# Patient Record
Sex: MALE | Race: WHITE
[De-identification: names, ages, dates, MRNs, and addresses within clinical notes are randomized per-mention and may not be internally consistent; named-entity substitution may affect disease eponyms.]

---

## 2018-05-10 ENCOUNTER — HOSPITAL ENCOUNTER (INPATIENT)
Dept: HOSPITAL 88 - FSED | Age: 58
LOS: 4 days | Discharge: HOME | DRG: 175 | End: 2018-05-14
Attending: INTERNAL MEDICINE | Admitting: INTERNAL MEDICINE
Payer: COMMERCIAL

## 2018-05-10 VITALS — DIASTOLIC BLOOD PRESSURE: 71 MMHG | SYSTOLIC BLOOD PRESSURE: 97 MMHG

## 2018-05-10 VITALS — SYSTOLIC BLOOD PRESSURE: 125 MMHG | DIASTOLIC BLOOD PRESSURE: 80 MMHG

## 2018-05-10 VITALS — DIASTOLIC BLOOD PRESSURE: 79 MMHG | SYSTOLIC BLOOD PRESSURE: 125 MMHG

## 2018-05-10 VITALS — DIASTOLIC BLOOD PRESSURE: 54 MMHG | SYSTOLIC BLOOD PRESSURE: 106 MMHG

## 2018-05-10 VITALS — DIASTOLIC BLOOD PRESSURE: 82 MMHG | SYSTOLIC BLOOD PRESSURE: 114 MMHG

## 2018-05-10 VITALS — SYSTOLIC BLOOD PRESSURE: 100 MMHG | DIASTOLIC BLOOD PRESSURE: 81 MMHG

## 2018-05-10 VITALS — WEIGHT: 288.56 LBS | HEIGHT: 69 IN | BODY MASS INDEX: 42.74 KG/M2

## 2018-05-10 VITALS — SYSTOLIC BLOOD PRESSURE: 88 MMHG | DIASTOLIC BLOOD PRESSURE: 60 MMHG

## 2018-05-10 VITALS — SYSTOLIC BLOOD PRESSURE: 103 MMHG | DIASTOLIC BLOOD PRESSURE: 80 MMHG

## 2018-05-10 VITALS — DIASTOLIC BLOOD PRESSURE: 92 MMHG | SYSTOLIC BLOOD PRESSURE: 100 MMHG

## 2018-05-10 VITALS — SYSTOLIC BLOOD PRESSURE: 95 MMHG | DIASTOLIC BLOOD PRESSURE: 75 MMHG

## 2018-05-10 VITALS — DIASTOLIC BLOOD PRESSURE: 71 MMHG | SYSTOLIC BLOOD PRESSURE: 96 MMHG

## 2018-05-10 VITALS — DIASTOLIC BLOOD PRESSURE: 59 MMHG | SYSTOLIC BLOOD PRESSURE: 110 MMHG

## 2018-05-10 VITALS — SYSTOLIC BLOOD PRESSURE: 159 MMHG | DIASTOLIC BLOOD PRESSURE: 122 MMHG

## 2018-05-10 VITALS — SYSTOLIC BLOOD PRESSURE: 102 MMHG | DIASTOLIC BLOOD PRESSURE: 70 MMHG

## 2018-05-10 VITALS — SYSTOLIC BLOOD PRESSURE: 98 MMHG | DIASTOLIC BLOOD PRESSURE: 66 MMHG

## 2018-05-10 VITALS — SYSTOLIC BLOOD PRESSURE: 116 MMHG | DIASTOLIC BLOOD PRESSURE: 65 MMHG

## 2018-05-10 VITALS — DIASTOLIC BLOOD PRESSURE: 69 MMHG | SYSTOLIC BLOOD PRESSURE: 119 MMHG

## 2018-05-10 VITALS — DIASTOLIC BLOOD PRESSURE: 98 MMHG | SYSTOLIC BLOOD PRESSURE: 122 MMHG

## 2018-05-10 VITALS — SYSTOLIC BLOOD PRESSURE: 111 MMHG | DIASTOLIC BLOOD PRESSURE: 73 MMHG

## 2018-05-10 VITALS — DIASTOLIC BLOOD PRESSURE: 79 MMHG | SYSTOLIC BLOOD PRESSURE: 109 MMHG

## 2018-05-10 VITALS — SYSTOLIC BLOOD PRESSURE: 56 MMHG | DIASTOLIC BLOOD PRESSURE: 26 MMHG

## 2018-05-10 VITALS — SYSTOLIC BLOOD PRESSURE: 105 MMHG | DIASTOLIC BLOOD PRESSURE: 66 MMHG

## 2018-05-10 VITALS — DIASTOLIC BLOOD PRESSURE: 89 MMHG | SYSTOLIC BLOOD PRESSURE: 119 MMHG

## 2018-05-10 VITALS — SYSTOLIC BLOOD PRESSURE: 115 MMHG | DIASTOLIC BLOOD PRESSURE: 63 MMHG

## 2018-05-10 VITALS — SYSTOLIC BLOOD PRESSURE: 104 MMHG | DIASTOLIC BLOOD PRESSURE: 67 MMHG

## 2018-05-10 VITALS — DIASTOLIC BLOOD PRESSURE: 70 MMHG | SYSTOLIC BLOOD PRESSURE: 111 MMHG

## 2018-05-10 VITALS — SYSTOLIC BLOOD PRESSURE: 104 MMHG | DIASTOLIC BLOOD PRESSURE: 86 MMHG

## 2018-05-10 VITALS — SYSTOLIC BLOOD PRESSURE: 105 MMHG | DIASTOLIC BLOOD PRESSURE: 71 MMHG

## 2018-05-10 VITALS — SYSTOLIC BLOOD PRESSURE: 100 MMHG | DIASTOLIC BLOOD PRESSURE: 52 MMHG

## 2018-05-10 VITALS — DIASTOLIC BLOOD PRESSURE: 84 MMHG | SYSTOLIC BLOOD PRESSURE: 104 MMHG

## 2018-05-10 VITALS — DIASTOLIC BLOOD PRESSURE: 82 MMHG | SYSTOLIC BLOOD PRESSURE: 111 MMHG

## 2018-05-10 VITALS — SYSTOLIC BLOOD PRESSURE: 104 MMHG | DIASTOLIC BLOOD PRESSURE: 82 MMHG

## 2018-05-10 VITALS — SYSTOLIC BLOOD PRESSURE: 53 MMHG | DIASTOLIC BLOOD PRESSURE: 42 MMHG

## 2018-05-10 DIAGNOSIS — I48.0: ICD-10-CM

## 2018-05-10 DIAGNOSIS — J96.01: ICD-10-CM

## 2018-05-10 DIAGNOSIS — I27.20: ICD-10-CM

## 2018-05-10 DIAGNOSIS — E87.1: ICD-10-CM

## 2018-05-10 DIAGNOSIS — F17.210: ICD-10-CM

## 2018-05-10 DIAGNOSIS — I26.99: Primary | ICD-10-CM

## 2018-05-10 DIAGNOSIS — E66.01: ICD-10-CM

## 2018-05-10 DIAGNOSIS — R60.0: ICD-10-CM

## 2018-05-10 LAB
CHOLEST SERPL-MCNC: 122 MD/DL (ref 0–199)
CHOLEST/HDLC SERPL: 3.6 {RATIO} (ref 3.9–4.7)
CK MB SERPL-MCNC: 1 NG/ML (ref 0–5)
CK MB SERPL-MCNC: 1.1 NG/ML (ref 0–5)
CK SERPL-CCNC: 82 IU/L (ref 30–200)
CK SERPL-CCNC: 85 IU/L (ref 30–200)
DEPRECATED INR PLAS: 1.28
HCT VFR BLD AUTO: 49.7 % (ref 38.2–49.6)
HDLC SERPL-MSCNC: 34 MG/DL (ref 40–60)
HGB BLD-MCNC: 16.6 G/DL (ref 14–18)
LDLC SERPL CALC-MCNC: 75 MG/DL (ref 60–130)
PROTHROMBIN TIME: 15 SECONDS (ref 11.9–14.5)
TRIGL SERPL-MCNC: 63 MG/DL (ref 0–149)

## 2018-05-10 PROCEDURE — 71260 CT THORAX DX C+: CPT

## 2018-05-10 PROCEDURE — 84484 ASSAY OF TROPONIN QUANT: CPT

## 2018-05-10 PROCEDURE — 96372 THER/PROPH/DIAG INJ SC/IM: CPT

## 2018-05-10 PROCEDURE — 83036 HEMOGLOBIN GLYCOSYLATED A1C: CPT

## 2018-05-10 PROCEDURE — 80053 COMPREHEN METABOLIC PANEL: CPT

## 2018-05-10 PROCEDURE — 85014 HEMATOCRIT: CPT

## 2018-05-10 PROCEDURE — 36415 COLL VENOUS BLD VENIPUNCTURE: CPT

## 2018-05-10 PROCEDURE — 85025 COMPLETE CBC W/AUTO DIFF WBC: CPT

## 2018-05-10 PROCEDURE — 85379 FIBRIN DEGRADATION QUANT: CPT

## 2018-05-10 PROCEDURE — 93306 TTE W/DOPPLER COMPLETE: CPT

## 2018-05-10 PROCEDURE — 82550 ASSAY OF CK (CPK): CPT

## 2018-05-10 PROCEDURE — 71045 X-RAY EXAM CHEST 1 VIEW: CPT

## 2018-05-10 PROCEDURE — 80048 BASIC METABOLIC PNL TOTAL CA: CPT

## 2018-05-10 PROCEDURE — 93005 ELECTROCARDIOGRAM TRACING: CPT

## 2018-05-10 PROCEDURE — 96376 TX/PRO/DX INJ SAME DRUG ADON: CPT

## 2018-05-10 PROCEDURE — 99284 EMERGENCY DEPT VISIT MOD MDM: CPT

## 2018-05-10 PROCEDURE — 96374 THER/PROPH/DIAG INJ IV PUSH: CPT

## 2018-05-10 PROCEDURE — 87040 BLOOD CULTURE FOR BACTERIA: CPT

## 2018-05-10 PROCEDURE — 96375 TX/PRO/DX INJ NEW DRUG ADDON: CPT

## 2018-05-10 PROCEDURE — 85610 PROTHROMBIN TIME: CPT

## 2018-05-10 PROCEDURE — 85018 HEMOGLOBIN: CPT

## 2018-05-10 PROCEDURE — 83735 ASSAY OF MAGNESIUM: CPT

## 2018-05-10 PROCEDURE — 84443 ASSAY THYROID STIM HORMONE: CPT

## 2018-05-10 PROCEDURE — 80061 LIPID PANEL: CPT

## 2018-05-10 PROCEDURE — 82553 CREATINE MB FRACTION: CPT

## 2018-05-10 PROCEDURE — 96367 TX/PROPH/DG ADDL SEQ IV INF: CPT

## 2018-05-10 PROCEDURE — 93970 EXTREMITY STUDY: CPT

## 2018-05-10 RX ADMIN — SODIUM CHLORIDE SCH GM: 9 INJECTION, SOLUTION INTRAVENOUS at 07:19

## 2018-05-10 RX ADMIN — METOPROLOL TARTRATE PRN MG: 1 INJECTION, SOLUTION INTRAVENOUS at 11:25

## 2018-05-10 RX ADMIN — Medication PRN MG: at 20:28

## 2018-05-10 RX ADMIN — METOPROLOL TARTRATE SCH MG: 25 TABLET, FILM COATED ORAL at 17:49

## 2018-05-10 RX ADMIN — LEVOFLOXACIN SCH MLS/HR: 5 INJECTION, SOLUTION INTRAVENOUS at 11:25

## 2018-05-10 RX ADMIN — ENOXAPARIN SODIUM SCH MG: 100 INJECTION SUBCUTANEOUS at 20:28

## 2018-05-10 RX ADMIN — METOPROLOL TARTRATE PRN MG: 1 INJECTION, SOLUTION INTRAVENOUS at 19:24

## 2018-05-10 NOTE — DIAGNOSTIC IMAGING REPORT
PROCEDURE:

CT scan of the chest  WITH intravenous contrast, using pulmonary 

angiogram protocol.

 

TECHNIQUE:

The chest was scanned utilizing a multidetector helical scanner from 

the lung apex through the level of the adrenal glands after the IV 

administration of 85 cc of Isovue 370.  Coronal and sagittal 

multiplanar reformations were obtained.

 

COMPARISON: None.

 

INDICATIONS:  ATRIAL FIBRILLATION, SHORTNESS OF BREATH

    

FINDINGS:

Lines/tubes:  None.

 

Lungs and Airways: 

Good contrast bolus.

Large pulmonary embolus in the right lower lobe pulmonary artery. 

Involvement of the lateral branch of the right middle lobe pulmonary 

artery. (Series 2 image 57) Small emboli in the posterior branch of the 

left upper lobe pulmonary artery. (Series 2 image 39) Questionable 

other eccentric filling defects in the left lower lobe.

 

 

Mild paraseptal emphysematous changes with upper lobe predominance. 

Diffuse groundglass opacities involving the right lower lobe.

A few scattered areas of groundglass opacities in the right middle and 

left upper lobe.

Left lower lobe atelectasis and possible aspiration/pneumonia.

Calcified granuloma in the right middle lobe (series 3 image 64).

Calcified granuloma in the left lower lobe (series 3 image 67).

 

Pleura: Small right pleural effusion.

 

Heart and mediastinum: The thyroid gland is normal. No significant 

mediastinal, hilar or axillary lymphadenopathy is seen. 0.8 cm 

subcarinal lymph node. 0.8 cm prevascular lymph node. The heart and 

pericardium are within normal limits. Main pulmonary artery is 2.4 cm. 

Ascending aorta measures 3.7 cm. Mild atherosclerotic calcifications in 

the aorta.

 

Soft tissues: Normal.

 

Abdomen: Limited contrast-enhanced views of the upper abdomen show no 

abnormality within the visualized liver, spleen, pancreas, or kidneys. 

The adrenal glands are normal. Nonspecific perinephric fat stranding 

bilateral kidneys. 3.1 cm cystic lesion in the superior pole of the 

left kidney.

 

Bones: The visualized bony thorax is within normal limits. 

 

IMPRESSION: 

 

1. Pulmonary embolus in bilateral lungs with large burden in the right 

lower lobe artery.

2. Diffuse groundglass opacities in the right lower lobe is likely 

related to hemorrhage/pulmonary infarct from pulmonary emboli.

3. Scattered mild nonspecific groundglass opacities in bilateral lungs. 

These may be infectious versus related to smaller non-visualized 

pulmonary emboli.

 

Results were communicated with a patient's nurse and Dr. Contreras by Dr. Okeefe on 5/10/2018 at 3:37 PM.

 

 

Dictated by:  Clifford Okeefe M.D. on 5/10/2018 at 15:41     

Electronically approved by:  Clifford Okeefe M.D. on 5/10/2018 at 15:41

## 2018-05-10 NOTE — CONSULTATION
DATE OF CONSULTATION:  May 10, 2018 



CARDIOLOGY CONSULTATION



REQUESTING PHYSICIAN:  Dr. Ben Woods.



REASON FOR CONSULTATION:  Atrial fibrillation with rapid ventricular 

response.



HISTORY OF PRESENT ILLNESS:  This is a 57-year-old man without significant 

past medical history, who presented with complaints of right-sided 

abdominal pain. The patient reports he was in his usual state of health 

until a few days prior when he developed what he described as a cold. 

Yesterday afternoon he developed a right-sided abdominal pain right 

underneath his rib cage. He did not think anything of this and proceeded to 

run some errands. However, this pain worsened yesterday evening such that 

it was 9 to 10 out of 10 in severity. He noted the pain was worse with 

cough, deep inspiration and lying down. The pain was associated with 

shortness of breath but no nausea or diaphoresis. Given worsening of the 

pain, he presented to the ER for further evaluation. On arrival to the ER, 

he was noted to have atrial fibrillation with rapid ventricular response, 

and chest x-ray demonstrated a medium-sized well-defined infiltrate in the 

right lower lobe and a small patchy infiltrate in the left lower lobe 

consistent with pneumonia and a leukocytosis at 15.2. He was, therefore, 

admitted to [facility] for further care. 



REVIEW OF SYSTEMS:  Negative except as per HPI.



PAST MEDICAL HISTORY:  Obesity.



PAST SURGICAL HISTORY:  Phimosis.



ALLERGIES:  NO KNOWN DRUG ALLERGIES.



MEDICATIONS:  None.



SOCIAL HISTORY:  He smokes a pack a day since the age of 12. Occasional 

alcohol. No illicit drugs.



FAMILY HISTORY:  Pertinent for father with heart disease. 



PHYSICAL EXAMINATION

VITAL SIGNS:  Temperature 96.5 degrees, pulse 156, respiratory rate 22, 

blood pressure 105/66, oxygen saturation 97% on nasal cannula.

GENERAL:  Obese gentleman in no acute distress, well developed, well 

nourished. 

HEENT:  Normocephalic, atraumatic. Pupils equal, no scleral icterus.

NECK:  Supple. No thyromegaly or cervical lymphadenopathy, no carotid 

bruits.

LUNGS:  Decreased breath sounds, scattered wheezes, no crackles.

CARDIOVASCULAR:  Tachycardic, irregularly irregular. No murmur. Normal S1 

and S2. 

ABDOMEN:  Soft, nontender.

EXTREMITIES:  1+ pitting edema. Lymphedema is also present. 



EKG:  Atrial fibrillation with rapid ventricular response.



LABS:  Sodium 135, potassium 4.2, chloride 99, CO2 27, BUN 14, creatinine 

1. D-dimer 970. Troponin less than 0.05. WBC 15.2, hemoglobin 18.2, 

hematocrit 29.9, platelets 152. 



IMPRESSION

1. Pneumonia.

2. Atrial fibrillation with rapid ventricular response.

3. Obesity. 

4. Right-sided abdominal pain, likely pleuritic in etiology.



RECOMMENDATIONS:  Start scheduled metoprolol tartrate for rate control. 

Continue IV metoprolol as necessary. Agree with anticoagulation with 

Lovenox. Antibiotics per primary service. Obtain TSH and echocardiogram. 



Thank you for this consult. We will continue to follow.





 





DD:  05/10/2018 13:03

DT:  05/10/2018 14:08

Job#:  [id number]

## 2018-05-10 NOTE — HISTORY AND PHYSICAL
PRIMARY CARE PHYSICIAN:  _______.



CHIEF COMPLAINT:  Right upper quadrant/right lower chest discomfort and 

severe shortness of breath.



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 

went shopping with a friend. Thereafter, he had seafood with oysters and 

froglegs, had several beers, and he returned to the ship. As he entered 

into bed, he had immediately jumped out of bed because of severe right 

upper quadrant/right lower chest discomfort. He denies any leg pain. He 

also started having shortness of breath, which has worsened. Denies any 

chest pain. Denies any history of venous thromboembolism. He does smoke 1 

pack of cigarettes per day. He was taken to the urgent care facility at [facility], found to be in atrial fibrillation with rapid 

ventricular response, and was sent here to the ICU at [facility]. The 

CTA done here showed a pulmonary embolism in bilateral lungs with large 

burden in right lower lobe artery, also ground-glass opacities seen. 

Patient admitted for further evaluation and management. Currently sitting 

in chair, unable to lie in bed. He has oxygen in place.



PAST MEDICAL HISTORY:  Cigarette abuse. Chronic leg edema.



PAST SURGICAL HISTORY:  Phimosis surgery as a 7-year-old boy.



ALLERGIES:  PER THE ELECTRONIC MEDICAL RECORDS.



FAMILY HISTORY:  No history of venous thromboembolism.



SOCIAL HISTORY:  Patient works as a captain on a ship. He smokes 1 pack of 

cigarettes per day. Occasional alcohol. No illicits.



MEDICATIONS:  Per the electronic medical records. Medications reviewed.



REVIEW OF SYSTEMS:  Denies any dizziness or chest pain.



VITAL SIGNS:  Have been reviewed. 



PHYSICAL EXAMINATION

GENERAL APPEARANCE:  A tired-appearing man resting in chair with nasal 

cannula in place.

HEENT:  Anicteric. 

CARDIOVASCULAR:  Normal S1/S2. 

LUNGS:  He has reduced breath sounds, mildly coarse. He has more reduced 

breath sounds at the bases. 

ABDOMEN:  Soft, nondistended. He has tenderness in the right upper 

quadrant/right lower rib region.

EXTREMITIES:  He has 1+ leg edema bilaterally. Calf exam is deferred.

SKIN:  Dry.

PSYCHIATRIC:  Normal affect.

NEUROLOGICALLY:  Alert and oriented x3. Moving all extremities.



LABS:  Reviewed.



ASSESSMENT AND PLAN:  This is a 57-year-old man.

1. Atrial fibrillation with rapid ventricular response. Start on 

anticoagulation. Will control his rate. Obtain a 2-D echocardiogram. 

Obtain TSH. Obtain lipid panel.

2. Pulmonary embolism in the bilateral lungs, large burden in the right 

lower lobe artery. Continue anticoagulation. Pulmonary consultation has 

been placed. I have encouraged the patient to quit all cigarette use.

3. Cigarette abuse. Quit cigarettes now. Will start nicotine patch.

4. Ground-glass opacities in the right lower lobe. Could be related to 

the pulmonary emboli versus a pulmonary infarct. We will defer to 

pulmonary services.

5. Leukocytosis. Could be related to marginalization in the setting of 

acute pulmonary emboli. Will treat him empirically with antibiotics in 

the meantime for coverage.

6. Obesity. BMI is 37.5. Will obtain hemoglobin A1c and screen for 

diabetes. Obtain lipid panel.

7. Prophylaxis. Will use Pepcid b.i.d. while he is on anticoagulation.

8. Disposition. Will reduce aspirin to 81 mg daily. Cardiac enzymes are 

negative. 

9. Critical care time. More than 35 minutes.





  







DD:  05/10/2018 17:00

DT:  05/10/2018 17:20

Job#:  [id number]

## 2018-05-11 VITALS — DIASTOLIC BLOOD PRESSURE: 96 MMHG | SYSTOLIC BLOOD PRESSURE: 112 MMHG

## 2018-05-11 VITALS — SYSTOLIC BLOOD PRESSURE: 123 MMHG | DIASTOLIC BLOOD PRESSURE: 68 MMHG

## 2018-05-11 VITALS — SYSTOLIC BLOOD PRESSURE: 115 MMHG | DIASTOLIC BLOOD PRESSURE: 84 MMHG

## 2018-05-11 VITALS — SYSTOLIC BLOOD PRESSURE: 88 MMHG | DIASTOLIC BLOOD PRESSURE: 71 MMHG

## 2018-05-11 VITALS — DIASTOLIC BLOOD PRESSURE: 56 MMHG | SYSTOLIC BLOOD PRESSURE: 119 MMHG

## 2018-05-11 VITALS — SYSTOLIC BLOOD PRESSURE: 104 MMHG | DIASTOLIC BLOOD PRESSURE: 69 MMHG

## 2018-05-11 VITALS — SYSTOLIC BLOOD PRESSURE: 123 MMHG | DIASTOLIC BLOOD PRESSURE: 75 MMHG

## 2018-05-11 VITALS — SYSTOLIC BLOOD PRESSURE: 98 MMHG | DIASTOLIC BLOOD PRESSURE: 73 MMHG

## 2018-05-11 VITALS — DIASTOLIC BLOOD PRESSURE: 66 MMHG | SYSTOLIC BLOOD PRESSURE: 106 MMHG

## 2018-05-11 VITALS — DIASTOLIC BLOOD PRESSURE: 55 MMHG | SYSTOLIC BLOOD PRESSURE: 115 MMHG

## 2018-05-11 VITALS — SYSTOLIC BLOOD PRESSURE: 121 MMHG | DIASTOLIC BLOOD PRESSURE: 78 MMHG

## 2018-05-11 VITALS — SYSTOLIC BLOOD PRESSURE: 98 MMHG | DIASTOLIC BLOOD PRESSURE: 69 MMHG

## 2018-05-11 VITALS — DIASTOLIC BLOOD PRESSURE: 72 MMHG | SYSTOLIC BLOOD PRESSURE: 117 MMHG

## 2018-05-11 VITALS — SYSTOLIC BLOOD PRESSURE: 106 MMHG | DIASTOLIC BLOOD PRESSURE: 70 MMHG

## 2018-05-11 VITALS — SYSTOLIC BLOOD PRESSURE: 122 MMHG | DIASTOLIC BLOOD PRESSURE: 79 MMHG

## 2018-05-11 VITALS — DIASTOLIC BLOOD PRESSURE: 66 MMHG | SYSTOLIC BLOOD PRESSURE: 111 MMHG

## 2018-05-11 VITALS — SYSTOLIC BLOOD PRESSURE: 124 MMHG | DIASTOLIC BLOOD PRESSURE: 61 MMHG

## 2018-05-11 VITALS — SYSTOLIC BLOOD PRESSURE: 94 MMHG | DIASTOLIC BLOOD PRESSURE: 46 MMHG

## 2018-05-11 VITALS — SYSTOLIC BLOOD PRESSURE: 100 MMHG | DIASTOLIC BLOOD PRESSURE: 67 MMHG

## 2018-05-11 VITALS — DIASTOLIC BLOOD PRESSURE: 52 MMHG | SYSTOLIC BLOOD PRESSURE: 111 MMHG

## 2018-05-11 VITALS — DIASTOLIC BLOOD PRESSURE: 69 MMHG | SYSTOLIC BLOOD PRESSURE: 124 MMHG

## 2018-05-11 VITALS — DIASTOLIC BLOOD PRESSURE: 63 MMHG | SYSTOLIC BLOOD PRESSURE: 123 MMHG

## 2018-05-11 VITALS — SYSTOLIC BLOOD PRESSURE: 116 MMHG | DIASTOLIC BLOOD PRESSURE: 65 MMHG

## 2018-05-11 VITALS — SYSTOLIC BLOOD PRESSURE: 101 MMHG | DIASTOLIC BLOOD PRESSURE: 58 MMHG

## 2018-05-11 VITALS — DIASTOLIC BLOOD PRESSURE: 60 MMHG | SYSTOLIC BLOOD PRESSURE: 107 MMHG

## 2018-05-11 VITALS — DIASTOLIC BLOOD PRESSURE: 73 MMHG | SYSTOLIC BLOOD PRESSURE: 116 MMHG

## 2018-05-11 VITALS — DIASTOLIC BLOOD PRESSURE: 61 MMHG | SYSTOLIC BLOOD PRESSURE: 99 MMHG

## 2018-05-11 VITALS — SYSTOLIC BLOOD PRESSURE: 102 MMHG | DIASTOLIC BLOOD PRESSURE: 64 MMHG

## 2018-05-11 VITALS — SYSTOLIC BLOOD PRESSURE: 117 MMHG | DIASTOLIC BLOOD PRESSURE: 83 MMHG

## 2018-05-11 VITALS — DIASTOLIC BLOOD PRESSURE: 55 MMHG | SYSTOLIC BLOOD PRESSURE: 101 MMHG

## 2018-05-11 VITALS — DIASTOLIC BLOOD PRESSURE: 75 MMHG | SYSTOLIC BLOOD PRESSURE: 111 MMHG

## 2018-05-11 VITALS — SYSTOLIC BLOOD PRESSURE: 126 MMHG | DIASTOLIC BLOOD PRESSURE: 72 MMHG

## 2018-05-11 VITALS — SYSTOLIC BLOOD PRESSURE: 107 MMHG | DIASTOLIC BLOOD PRESSURE: 59 MMHG

## 2018-05-11 VITALS — DIASTOLIC BLOOD PRESSURE: 48 MMHG | SYSTOLIC BLOOD PRESSURE: 87 MMHG

## 2018-05-11 VITALS — DIASTOLIC BLOOD PRESSURE: 66 MMHG | SYSTOLIC BLOOD PRESSURE: 116 MMHG

## 2018-05-11 VITALS — SYSTOLIC BLOOD PRESSURE: 95 MMHG | DIASTOLIC BLOOD PRESSURE: 69 MMHG

## 2018-05-11 VITALS — SYSTOLIC BLOOD PRESSURE: 113 MMHG | DIASTOLIC BLOOD PRESSURE: 79 MMHG

## 2018-05-11 VITALS — DIASTOLIC BLOOD PRESSURE: 84 MMHG | SYSTOLIC BLOOD PRESSURE: 131 MMHG

## 2018-05-11 VITALS — SYSTOLIC BLOOD PRESSURE: 102 MMHG | DIASTOLIC BLOOD PRESSURE: 59 MMHG

## 2018-05-11 VITALS — DIASTOLIC BLOOD PRESSURE: 89 MMHG | SYSTOLIC BLOOD PRESSURE: 118 MMHG

## 2018-05-11 VITALS — SYSTOLIC BLOOD PRESSURE: 113 MMHG | DIASTOLIC BLOOD PRESSURE: 70 MMHG

## 2018-05-11 LAB
ANION GAP SERPL CALC-SCNC: 14.1 MMOL/L (ref 8–16)
ANISOCYTOSIS BLD QL SMEAR: SLIGHT
BASOPHILS # BLD AUTO: 0 10*3/UL (ref 0–0.1)
BASOPHILS NFR BLD AUTO: 0.2 % (ref 0–1)
BUN SERPL-MCNC: 12 MG/DL (ref 7–26)
BUN/CREAT SERPL: 16 (ref 6–25)
CALCIUM SERPL-MCNC: 9 MG/DL (ref 8.4–10.2)
CHLORIDE SERPL-SCNC: 97 MMOL/L (ref 98–107)
CHOLEST SERPL-MCNC: 105 MD/DL (ref 0–199)
CHOLEST/HDLC SERPL: 3 {RATIO} (ref 3.9–4.7)
CO2 SERPL-SCNC: 23 MMOL/L (ref 22–29)
DEPRECATED INR PLAS: 1.3
DEPRECATED NEUTROPHILS # BLD AUTO: 13.2 10*3/UL (ref 2.1–6.9)
EGFRCR SERPLBLD CKD-EPI 2021: > 60 ML/MIN (ref 60–?)
EOSINOPHIL # BLD AUTO: 0.1 10*3/UL (ref 0–0.4)
EOSINOPHIL NFR BLD AUTO: 0.6 % (ref 0–6)
ERYTHROCYTE [DISTWIDTH] IN CORD BLOOD: 14.1 % (ref 11.7–14.4)
GLUCOSE SERPLBLD-MCNC: 130 MG/DL (ref 74–118)
HCT VFR BLD AUTO: 48.4 % (ref 38.2–49.6)
HDLC SERPL-MSCNC: 35 MG/DL (ref 40–60)
HGB BLD-MCNC: 16.1 G/DL (ref 14–18)
LDLC SERPL CALC-MCNC: 60 MG/DL (ref 60–130)
LYMPHOCYTES # BLD: 1.4 10*3/UL (ref 1–3.2)
LYMPHOCYTES NFR BLD AUTO: 8.1 % (ref 18–39.1)
LYMPHOCYTES NFR BLD MANUAL: 7 % (ref 19–48)
MACROCYTES BLD QL SMEAR: SLIGHT
MCH RBC QN AUTO: 30 PG (ref 28–32)
MCHC RBC AUTO-ENTMCNC: 33.3 G/DL (ref 31–35)
MCV RBC AUTO: 90.1 FL (ref 81–99)
MONOCYTES # BLD AUTO: 1.8 10*3/UL (ref 0.2–0.8)
MONOCYTES NFR BLD AUTO: 10.7 % (ref 4.4–11.3)
MONOCYTES NFR BLD MANUAL: 13 % (ref 3.4–9)
NEUTS SEG NFR BLD AUTO: 79.7 % (ref 38.7–80)
NEUTS SEG NFR BLD MANUAL: 80 % (ref 40–74)
PLAT MORPH BLD: NORMAL
PLATELET # BLD AUTO: 178 X10E3/UL (ref 140–360)
PLATELET # BLD EST: ADEQUATE 10*3/UL
POTASSIUM SERPL-SCNC: 4.1 MMOL/L (ref 3.5–5.1)
PROTHROMBIN TIME: 15.2 SECONDS (ref 11.9–14.5)
RBC # BLD AUTO: 5.37 X10E6/UL (ref 4.3–5.7)
RBC MORPH BLD: NORMAL
SODIUM SERPL-SCNC: 130 MMOL/L (ref 136–145)
TRIGL SERPL-MCNC: 52 MG/DL (ref 0–149)

## 2018-05-11 RX ADMIN — SODIUM CHLORIDE SCH GM: 9 INJECTION, SOLUTION INTRAVENOUS at 05:26

## 2018-05-11 RX ADMIN — METOPROLOL TARTRATE SCH MG: 25 TABLET, FILM COATED ORAL at 05:26

## 2018-05-11 RX ADMIN — METOPROLOL TARTRATE SCH MG: 25 TABLET, FILM COATED ORAL at 00:26

## 2018-05-11 RX ADMIN — METOPROLOL TARTRATE SCH MG: 25 TABLET, FILM COATED ORAL at 11:56

## 2018-05-11 RX ADMIN — Medication PRN MG: at 05:27

## 2018-05-11 RX ADMIN — METOPROLOL TARTRATE SCH MG: 25 TABLET, FILM COATED ORAL at 18:07

## 2018-05-11 RX ADMIN — ASPIRIN 81 MG CHEWABLE TABLET SCH MG: 81 TABLET CHEWABLE at 09:03

## 2018-05-11 RX ADMIN — NICOTINE SCH MG: 21 PATCH, EXTENDED RELEASE TRANSDERMAL at 09:00

## 2018-05-11 RX ADMIN — LEVOFLOXACIN SCH MLS/HR: 5 INJECTION, SOLUTION INTRAVENOUS at 11:51

## 2018-05-11 RX ADMIN — ENOXAPARIN SODIUM SCH MG: 100 INJECTION SUBCUTANEOUS at 20:24

## 2018-05-11 RX ADMIN — METOPROLOL TARTRATE PRN MG: 1 INJECTION, SOLUTION INTRAVENOUS at 16:27

## 2018-05-11 RX ADMIN — ENOXAPARIN SODIUM SCH MG: 100 INJECTION SUBCUTANEOUS at 09:03

## 2018-05-11 NOTE — PROGRESS NOTE
DATE:  May 11, 2018 



CARDIOLOGY PROGRESS NOTE 



SUBJECTIVE:  Patient denies shortness of breath.  He continues to have 

right-sided chest pain.  Extensive discussion was held with the patient 

regarding anticoagulation for the patient's newly diagnosed pulmonary 

emboli.  Discussion included vitamin K antagonist such as Coumadin or 

warfarin and the need for consistent diet and frequent INR checks as well 

as novel oral anticoagulants which are not reversible.  Extensive 

discussion was held with the patient regarding vitamin K antagonist and 

novel anticoagulants.  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 

the ship, thus novel oral anticoagulants are probably the better option in 

his circumstances.  He indicates that he does not believe that he would 

have any problems obtaining the novel oral anticoagulation.  He understands 

that these do not have reversal agents readily available.



OBJECTIVE

VITALS:  Temperature 97.7 degrees, pulse 129, respiratory rate 20, blood 

pressure 106/70, oxygen saturation 96% on 3 L nasal cannula.  

GENERAL:  Obese gentleman in no acute distress, awake and alert.

LUNGS:  Decreased breath sounds at the bases.  No wheezes or crackles.

CARDIOVASCULAR:  Tachycardic, irregularly irregular.  Normal S1 and S2.  No 

murmur.

ABDOMEN:  Soft.

EXTREMITIES:  There is 1+ pitting edema.  Lymphedema is noted.



CARDIAC MEDICATIONS 

1. Metoprolol tartrate 25 mg p.o. q.6 h. 

2. Aspirin 81 mg p.o. daily.

3. Enoxaparin 100 mg p.o. subcutaneous q.24 h. 

4. Warfarin 7.5 mg p.o. daily.



LABS:  WBC 16.59, hemoglobin 16.1, hematocrit 48.4, platelets 178.  Sodium 

130, potassium 4.1, chloride 97, CO2 23, BUN 12, creatinine 0.74.  Troponin 

less than 0.001.  Cholesterol 105, LDL 60, HDL 35, triglycerides 52.  



CT of the chest:  Pulmonary embolus in bilateral lungs with large burden in 

the right lower lobe artery.  Diffuse ground-glass opacities in the right 

lower lobe are likely related to hemorrhage/pulmonary infarct from 

pulmonary emboli.  Scattered mild nonspecific ground-glass opacities in 

bilateral lungs.  These may be infectious versus related to smaller, 

nonvisualized, pulmonary emboli.  



TELEMETRY:  Atrial fibrillation with rapid ventricular response.



IMPRESSION

1. Bilateral pulmonary emboli with large burden in the right lower lobe 

artery. 

2. Atrial fibrillation with rapid ventricular response.

3. Acute hypoxic respiratory failure due to pulmonary emboli.

4. Pleuritic chest pain.

5. Obesity. 



RECOMMENDATIONS:  We will titrate up metoprolol tartrate.  Stop warfarin.  

Continue the patient on Lovenox.  He will need to be discharged on Eliquis 

10 mg p.o. twice a day for 1 week followed by 5 mg daily thereafter.  In 

the meantime, continue current cardiac medications.



Thank you for this consult.  We will continue to follow.





 _________________________________

DELIA MIRAMONTES MD



DD:  05/11/2018 13:25

DT:  05/11/2018 13:34

Job#:  [id number]

## 2018-05-11 NOTE — CONSULTATION
DATE OF CONSULTATION:    



The patient is a 57-year-old male who I was consulted after the patient was 

found to have bilateral pulmonary emboli.  The patient 2 days ago in the 

afternoon had some cough without phlegm, but he was in the beginning 

feeling right upper quadrant pain.  It was moderate and became very severe. 

 Then he decided to come to the hospital, and this was yesterday.  He was 

having at the same time shortness of breath.  No previous similar symptoms. 

 He reports loss of 60 pounds in 1 year, but it was a voluntary decision.  

He denies hypertension, diabetes mellitus, hyperlipidemia, heart, lungs, 

GI, , eyes, ears, neurology problems.  



SURGERIES:  Phimosis at the age of 7, no other surgery. 



ALLERGIES:  HE IS NOT ALLERGIC TO MEDICATIONS. 



SOCIAL HISTORY:  He has smoked since the age of 12 until the actual 

admission 1 pack per day.  Occasional alcohol use.  Denies illicit drug 

abuse.  He works as a captain on a ship.



REVIEW OF SYSTEMS:  He denies fever, chills, sore throat, abdominal pain, 

urinary difficulty, constipation, or diarrhea.  He did not have any episode 

of venous thromboembolism in the past.  



PHYSICAL EXAMINATION 

GENERAL:  He is alert and cooperative, in no acute distress.

VITALS:  Heart rate 135, atrial fibrillation.  Oxygen saturation 98% on 3 L 

of oxygen by nasal cannula.  Blood pressure 102/67.  

HEENT:  Atraumatic.

NECK:  No tenderness.

HEART:  No murmurs.

LUNGS:  Decreased breath sounds in the bases.

ABDOMEN:  Soft.  There is some tenderness in the right upper quadrant. 

EXTREMITIES:  Pedal edema 3+.  Patient has morbid obesity.

PSYCHIATRIC:  Normal affect.

NEUROLOGIC:  Alert and oriented times 3.  Moves all the extremities.  



LABS:  WBC 15.2, hemoglobin 18.2, glucose 157.  D-dimer 970.  CPK MB 1.5.  

Troponin 0.005.  The rest of the labs are unremarkable.  



CT scan of the chest disclosed bilateral pulmonary emboli.  The chest x-ray 

disclosed bibasilar pneumonia.  Electrocardiogram:  Atrial fibrillation.



IMPRESSION 

1. Pulmonary emboli.

2. Atrial fibrillation.

3. Active smoker.  

4. He has elevated hemoglobin like polycytosis.  He has a high level of 

sugar, morbid obesity and pedal edema.



In terms of treatment, the patient was started on Lovenox 100 mg q.12 

subcutaneously.  He is getting Rocephin 1 g q.24 h. IV.  He is on Levaquin 

750 IV daily.  He is on a nicotine patch 21 mg daily and aspirin 81 a day.  

He was started with warfarin 7.5 a day.  He was started also on metoprolol 

25 mg q.6 h. and Tylenol 650 q.6 h. 



My recommendation is to continue with the Lovenox.  I agree with the 

current regimen of antibiotics.  The patient may need some treatment for 

his atrial fibrillation and heart evaluation including echocardiogram.  I 

agree also with the nicotine patch.



 

Time spent for critical care evaluation was 40 minutes.  This includes 

interrogation, physical examination, direct observation of the different 

chest films.

 





DD:  05/11/2018 09:06

DT:  05/11/2018 09:35

Job#:  [id number]

## 2018-05-12 VITALS — SYSTOLIC BLOOD PRESSURE: 99 MMHG | DIASTOLIC BLOOD PRESSURE: 69 MMHG

## 2018-05-12 VITALS — SYSTOLIC BLOOD PRESSURE: 104 MMHG | DIASTOLIC BLOOD PRESSURE: 49 MMHG

## 2018-05-12 VITALS — SYSTOLIC BLOOD PRESSURE: 99 MMHG | DIASTOLIC BLOOD PRESSURE: 52 MMHG

## 2018-05-12 VITALS — DIASTOLIC BLOOD PRESSURE: 62 MMHG | SYSTOLIC BLOOD PRESSURE: 107 MMHG

## 2018-05-12 VITALS — DIASTOLIC BLOOD PRESSURE: 62 MMHG | SYSTOLIC BLOOD PRESSURE: 88 MMHG

## 2018-05-12 VITALS — DIASTOLIC BLOOD PRESSURE: 69 MMHG | SYSTOLIC BLOOD PRESSURE: 104 MMHG

## 2018-05-12 VITALS — SYSTOLIC BLOOD PRESSURE: 90 MMHG | DIASTOLIC BLOOD PRESSURE: 55 MMHG

## 2018-05-12 VITALS — SYSTOLIC BLOOD PRESSURE: 127 MMHG | DIASTOLIC BLOOD PRESSURE: 67 MMHG

## 2018-05-12 VITALS — DIASTOLIC BLOOD PRESSURE: 49 MMHG | SYSTOLIC BLOOD PRESSURE: 104 MMHG

## 2018-05-12 VITALS — SYSTOLIC BLOOD PRESSURE: 100 MMHG | DIASTOLIC BLOOD PRESSURE: 73 MMHG

## 2018-05-12 VITALS — SYSTOLIC BLOOD PRESSURE: 112 MMHG | DIASTOLIC BLOOD PRESSURE: 65 MMHG

## 2018-05-12 VITALS — DIASTOLIC BLOOD PRESSURE: 54 MMHG | SYSTOLIC BLOOD PRESSURE: 106 MMHG

## 2018-05-12 VITALS — DIASTOLIC BLOOD PRESSURE: 59 MMHG | SYSTOLIC BLOOD PRESSURE: 120 MMHG

## 2018-05-12 VITALS — SYSTOLIC BLOOD PRESSURE: 120 MMHG | DIASTOLIC BLOOD PRESSURE: 66 MMHG

## 2018-05-12 VITALS — SYSTOLIC BLOOD PRESSURE: 114 MMHG | DIASTOLIC BLOOD PRESSURE: 63 MMHG

## 2018-05-12 VITALS — DIASTOLIC BLOOD PRESSURE: 70 MMHG | SYSTOLIC BLOOD PRESSURE: 107 MMHG

## 2018-05-12 VITALS — SYSTOLIC BLOOD PRESSURE: 108 MMHG | DIASTOLIC BLOOD PRESSURE: 55 MMHG

## 2018-05-12 VITALS — DIASTOLIC BLOOD PRESSURE: 46 MMHG | SYSTOLIC BLOOD PRESSURE: 105 MMHG

## 2018-05-12 VITALS — DIASTOLIC BLOOD PRESSURE: 55 MMHG | SYSTOLIC BLOOD PRESSURE: 111 MMHG

## 2018-05-12 VITALS — SYSTOLIC BLOOD PRESSURE: 111 MMHG | DIASTOLIC BLOOD PRESSURE: 58 MMHG

## 2018-05-12 VITALS — SYSTOLIC BLOOD PRESSURE: 126 MMHG | DIASTOLIC BLOOD PRESSURE: 70 MMHG

## 2018-05-12 VITALS — DIASTOLIC BLOOD PRESSURE: 92 MMHG | SYSTOLIC BLOOD PRESSURE: 120 MMHG

## 2018-05-12 VITALS — SYSTOLIC BLOOD PRESSURE: 104 MMHG | DIASTOLIC BLOOD PRESSURE: 72 MMHG

## 2018-05-12 VITALS — DIASTOLIC BLOOD PRESSURE: 57 MMHG | SYSTOLIC BLOOD PRESSURE: 113 MMHG

## 2018-05-12 VITALS — SYSTOLIC BLOOD PRESSURE: 86 MMHG | DIASTOLIC BLOOD PRESSURE: 55 MMHG

## 2018-05-12 VITALS — DIASTOLIC BLOOD PRESSURE: 58 MMHG | SYSTOLIC BLOOD PRESSURE: 110 MMHG

## 2018-05-12 VITALS — DIASTOLIC BLOOD PRESSURE: 55 MMHG | SYSTOLIC BLOOD PRESSURE: 113 MMHG

## 2018-05-12 VITALS — DIASTOLIC BLOOD PRESSURE: 53 MMHG | SYSTOLIC BLOOD PRESSURE: 123 MMHG

## 2018-05-12 VITALS — SYSTOLIC BLOOD PRESSURE: 132 MMHG | DIASTOLIC BLOOD PRESSURE: 79 MMHG

## 2018-05-12 VITALS — DIASTOLIC BLOOD PRESSURE: 62 MMHG | SYSTOLIC BLOOD PRESSURE: 115 MMHG

## 2018-05-12 VITALS — SYSTOLIC BLOOD PRESSURE: 111 MMHG | DIASTOLIC BLOOD PRESSURE: 59 MMHG

## 2018-05-12 VITALS — DIASTOLIC BLOOD PRESSURE: 54 MMHG | SYSTOLIC BLOOD PRESSURE: 113 MMHG

## 2018-05-12 VITALS — DIASTOLIC BLOOD PRESSURE: 46 MMHG | SYSTOLIC BLOOD PRESSURE: 88 MMHG

## 2018-05-12 VITALS — SYSTOLIC BLOOD PRESSURE: 117 MMHG | DIASTOLIC BLOOD PRESSURE: 67 MMHG

## 2018-05-12 VITALS — SYSTOLIC BLOOD PRESSURE: 92 MMHG | DIASTOLIC BLOOD PRESSURE: 76 MMHG

## 2018-05-12 VITALS — SYSTOLIC BLOOD PRESSURE: 122 MMHG | DIASTOLIC BLOOD PRESSURE: 62 MMHG

## 2018-05-12 VITALS — SYSTOLIC BLOOD PRESSURE: 99 MMHG | DIASTOLIC BLOOD PRESSURE: 55 MMHG

## 2018-05-12 VITALS — SYSTOLIC BLOOD PRESSURE: 113 MMHG | DIASTOLIC BLOOD PRESSURE: 68 MMHG

## 2018-05-12 VITALS — SYSTOLIC BLOOD PRESSURE: 110 MMHG | DIASTOLIC BLOOD PRESSURE: 60 MMHG

## 2018-05-12 VITALS — DIASTOLIC BLOOD PRESSURE: 67 MMHG | SYSTOLIC BLOOD PRESSURE: 118 MMHG

## 2018-05-12 VITALS — DIASTOLIC BLOOD PRESSURE: 64 MMHG | SYSTOLIC BLOOD PRESSURE: 117 MMHG

## 2018-05-12 VITALS — SYSTOLIC BLOOD PRESSURE: 114 MMHG | DIASTOLIC BLOOD PRESSURE: 72 MMHG

## 2018-05-12 VITALS — DIASTOLIC BLOOD PRESSURE: 50 MMHG | SYSTOLIC BLOOD PRESSURE: 78 MMHG

## 2018-05-12 VITALS — SYSTOLIC BLOOD PRESSURE: 94 MMHG | DIASTOLIC BLOOD PRESSURE: 50 MMHG

## 2018-05-12 VITALS — DIASTOLIC BLOOD PRESSURE: 68 MMHG | SYSTOLIC BLOOD PRESSURE: 113 MMHG

## 2018-05-12 VITALS — DIASTOLIC BLOOD PRESSURE: 70 MMHG | SYSTOLIC BLOOD PRESSURE: 122 MMHG

## 2018-05-12 VITALS — DIASTOLIC BLOOD PRESSURE: 69 MMHG | SYSTOLIC BLOOD PRESSURE: 120 MMHG

## 2018-05-12 VITALS — SYSTOLIC BLOOD PRESSURE: 127 MMHG | DIASTOLIC BLOOD PRESSURE: 66 MMHG

## 2018-05-12 LAB
ANION GAP SERPL CALC-SCNC: 12.9 MMOL/L (ref 8–16)
BASOPHILS # BLD AUTO: 0 10*3/UL (ref 0–0.1)
BASOPHILS NFR BLD AUTO: 0.3 % (ref 0–1)
BUN SERPL-MCNC: 9 MG/DL (ref 7–26)
BUN/CREAT SERPL: 12 (ref 6–25)
CALCIUM SERPL-MCNC: 9.2 MG/DL (ref 8.4–10.2)
CHLORIDE SERPL-SCNC: 96 MMOL/L (ref 98–107)
CO2 SERPL-SCNC: 25 MMOL/L (ref 22–29)
DEPRECATED NEUTROPHILS # BLD AUTO: 11.9 10*3/UL (ref 2.1–6.9)
EGFRCR SERPLBLD CKD-EPI 2021: > 60 ML/MIN (ref 60–?)
EOSINOPHIL # BLD AUTO: 0.1 10*3/UL (ref 0–0.4)
EOSINOPHIL NFR BLD AUTO: 0.5 % (ref 0–6)
EOSINOPHIL NFR BLD MANUAL: 1 % (ref 0–7)
ERYTHROCYTE [DISTWIDTH] IN CORD BLOOD: 13.9 % (ref 11.7–14.4)
GLUCOSE SERPLBLD-MCNC: 134 MG/DL (ref 74–118)
HCT VFR BLD AUTO: 46.6 % (ref 38.2–49.6)
HGB BLD-MCNC: 15.8 G/DL (ref 14–18)
LYMPHOCYTES # BLD: 1.4 10*3/UL (ref 1–3.2)
LYMPHOCYTES NFR BLD AUTO: 9.1 % (ref 18–39.1)
LYMPHOCYTES NFR BLD MANUAL: 11 % (ref 19–48)
MCH RBC QN AUTO: 30.2 PG (ref 28–32)
MCHC RBC AUTO-ENTMCNC: 33.9 G/DL (ref 31–35)
MCV RBC AUTO: 88.9 FL (ref 81–99)
MONOCYTES # BLD AUTO: 2 10*3/UL (ref 0.2–0.8)
MONOCYTES NFR BLD AUTO: 12.6 % (ref 4.4–11.3)
MONOCYTES NFR BLD MANUAL: 12 % (ref 3.4–9)
NEUTS SEG NFR BLD AUTO: 76.7 % (ref 38.7–80)
NEUTS SEG NFR BLD MANUAL: 76 % (ref 40–74)
PLAT MORPH BLD: NORMAL
PLATELET # BLD AUTO: 173 X10E3/UL (ref 140–360)
PLATELET # BLD EST: ADEQUATE 10*3/UL
POTASSIUM SERPL-SCNC: 3.9 MMOL/L (ref 3.5–5.1)
RBC # BLD AUTO: 5.24 X10E6/UL (ref 4.3–5.7)
RBC MORPH BLD: NORMAL
SODIUM SERPL-SCNC: 130 MMOL/L (ref 136–145)

## 2018-05-12 RX ADMIN — ENOXAPARIN SODIUM SCH MG: 100 INJECTION SUBCUTANEOUS at 20:46

## 2018-05-12 RX ADMIN — METOPROLOL TARTRATE SCH MG: 25 TABLET, FILM COATED ORAL at 17:26

## 2018-05-12 RX ADMIN — Medication PRN MG: at 09:56

## 2018-05-12 RX ADMIN — ENOXAPARIN SODIUM SCH MG: 100 INJECTION SUBCUTANEOUS at 09:55

## 2018-05-12 RX ADMIN — ASPIRIN 81 MG CHEWABLE TABLET SCH MG: 81 TABLET CHEWABLE at 09:55

## 2018-05-12 RX ADMIN — METOPROLOL TARTRATE SCH MG: 25 TABLET, FILM COATED ORAL at 13:21

## 2018-05-12 RX ADMIN — SODIUM CHLORIDE SCH GM: 9 INJECTION, SOLUTION INTRAVENOUS at 06:44

## 2018-05-12 RX ADMIN — Medication PRN MG: at 00:35

## 2018-05-12 RX ADMIN — METOPROLOL TARTRATE SCH MG: 25 TABLET, FILM COATED ORAL at 00:00

## 2018-05-12 RX ADMIN — NICOTINE SCH MG: 21 PATCH, EXTENDED RELEASE TRANSDERMAL at 09:00

## 2018-05-12 RX ADMIN — METOPROLOL TARTRATE SCH MG: 25 TABLET, FILM COATED ORAL at 00:47

## 2018-05-12 RX ADMIN — LEVOFLOXACIN SCH MLS/HR: 5 INJECTION, SOLUTION INTRAVENOUS at 09:55

## 2018-05-12 RX ADMIN — METOPROLOL TARTRATE SCH MG: 25 TABLET, FILM COATED ORAL at 06:59

## 2018-05-12 RX ADMIN — DIGOXIN SCH MG: 0.25 TABLET ORAL at 09:55

## 2018-05-12 NOTE — PROGRESS NOTE
DATE:    



PULMONARY PROGRESS NOTE



SUBJECTIVE:  Reporting the progress note of the lung consultation. Patient, 

a 57-year-old male, was admitted due to pulmonary emboli, and he was 

started on Lovenox. Currently feeling better. Less abdominal pains. 

Breathing fine. No new complaints. 



OBJECTIVE

VITAL SIGNS:  In terms of the vitals, he is very stable. 

HEENT:  Atraumatic.

NECK:  No tenderness.

LUNGS:  Decreased breath sounds in the bases.

HEART:  No murmurs.

ABDOMEN:  Soft. No organomegaly.

EXTREMITIES:  Pedal edema.



IMPRESSION

1. Atrial fibrillation.

2. Pulmonary embolism.

3. History of smoking.

4. Obesity.

5. Pedal edema.



Recommendation to continue with the Lovenox 100 mg q.12 subcutaneously. 

Besides that, he is on Rocephin and Levophed IV. Rocephin 1 g q.24 h., 

Levophed 750 daily. He is on aspirin 81 a day and different pain 

medications. Recommendation to continue with the current treatment. 

Probably in 1 or 2 days will start with oral anticoagulants. According to 

the nurse, one of the 

consultants reported that after the Lovenox has been stopped the patient 

will be discharged on Eliquis.









DD:  05/12/2018 13:04

DT:  05/12/2018 13:29

Job#:  [id number]

## 2018-05-12 NOTE — PROGRESS NOTE
DATE:  May 12, 2018 



CARDIOLOGY PROGRESS NOTE



SUBJECTIVE:  No new complaints.  



OBJECTIVE 

VITALS:  Temperature 101, respiratory rate 110, respiratory rate 20, blood 

pressure 88/62 up to 117/67, O2 sat 92% on room air.

GENERAL:  No acute distress.  Alert. 

CHEST:  Clear to auscultation.  

CARDIOVASCULAR:  Irregularly irregular rate and rhythm.  Normal S1 and S2. 

ABDOMEN:  Soft. 

EXTREMITIES:  One plus edema in bilateral lower extremities. 



CARDIOVASCULAR MEDICATIONS

1.  Digoxin 0.25 mg daily.  

2.  Aspirin 81 mg daily.  

3.  Lovenox 100 subcutaneous q.12 h. 

4.  Metoprolol tartrate 50 mg q.6 h. p.o. 

5.  Nicotine patch in place.



White blood cells 15.5, hemoglobin 15.8 and platelets 173,000.  INR 1.3, 

creatinine 0.7, sodium 130, potassium 3.9, chloride 96, bicarbonate 25, 

glucose 134, calcium 9.2.



ASSESSMENT

1.  Large right pulmonary embolism.

2.  Fever.

3.  Atrial fibrillation with episodes of rapid ventricular response.

4.  Preserved left ventricular systolic function:  Mild concentric 

hypertrophy on echocardiogram.

5.  Status post acute hypoxic respiratory failure in the setting of 

pulmonary embolism. 

6.  Pleuritic type chest pain.  

7.  Morbid obesity and lower extremity edema.  



RECOMMENDATIONS:  Upon discharge, plan on Eliquis 10 mg twice a day for 1 

week followed by 5 mg every 12 hours thereafter.  Continue current 

cardiovascular medications.  Rate control improving.  Continue workup.  

Defer to primary service.











DD:  05/12/2018 12:50

DT:  05/12/2018 13:07

Job#:  [id number]

## 2018-05-12 NOTE — PROGRESS NOTE
DATE:  May 11, 2018 



TIME:  7:15 a.m.



OVERNIGHT:  Short of breath, unable to lie down, sitting on chair most of 

the night.



REVIEW OF SYSTEMS:  Denies any chest pain.



PHYSICAL EXAMINATION:

VITAL SIGNS:  Reviewed.

GENERAL APPEARANCE:  Tired-appearing man resting in bed.

HEENT:  Anicteric.

CARDIOVASCULAR:  Normal S1 and S2.

LUNGS:  Reduced breath sounds at base.

ABDOMEN:  Soft, nondistended.  Less tender in the right upper 

quadrant/right lower chest wall.

EXTREMITIES:  He has 1+ leg edema bilaterally.

SKIN:  Dry.

PSYCHIATRIC:  Flat affect.



LABS:  Reviewed.



MEDICATIONS:  Reviewed.



ASSESSMENT:  A 57-year-old man.

1. Atrial fibrillation with rapid ventricular response.

2. Pulmonary embolism in bilateral lungs.

3. Cigarette abuse.

4. Ground-glass opacity, right lower lobe.

5. Leukocytosis.

6. Obesity.  Body mass index 37.5.

7. Hyponatremia.



PLAN:

1. Continue anticoagulation.

2. Follow up 2D echocardiogram.

3. Continue nicotine patch.

4. All cultures remain negative.

5. Follow up white blood cell count.

6. Follow up labs.



Critical care time more than 35 minutes.









DD:  05/11/2018 23:30

DT:  05/11/2018 23:42

Job#:  [id number]

## 2018-05-13 VITALS — DIASTOLIC BLOOD PRESSURE: 59 MMHG | SYSTOLIC BLOOD PRESSURE: 108 MMHG

## 2018-05-13 VITALS — DIASTOLIC BLOOD PRESSURE: 47 MMHG | SYSTOLIC BLOOD PRESSURE: 105 MMHG

## 2018-05-13 VITALS — DIASTOLIC BLOOD PRESSURE: 53 MMHG | SYSTOLIC BLOOD PRESSURE: 114 MMHG

## 2018-05-13 VITALS — SYSTOLIC BLOOD PRESSURE: 120 MMHG | DIASTOLIC BLOOD PRESSURE: 61 MMHG

## 2018-05-13 VITALS — DIASTOLIC BLOOD PRESSURE: 62 MMHG | SYSTOLIC BLOOD PRESSURE: 125 MMHG

## 2018-05-13 VITALS — DIASTOLIC BLOOD PRESSURE: 62 MMHG | SYSTOLIC BLOOD PRESSURE: 124 MMHG

## 2018-05-13 VITALS — SYSTOLIC BLOOD PRESSURE: 116 MMHG | DIASTOLIC BLOOD PRESSURE: 61 MMHG

## 2018-05-13 VITALS — DIASTOLIC BLOOD PRESSURE: 56 MMHG | SYSTOLIC BLOOD PRESSURE: 119 MMHG

## 2018-05-13 VITALS — DIASTOLIC BLOOD PRESSURE: 59 MMHG | SYSTOLIC BLOOD PRESSURE: 123 MMHG

## 2018-05-13 VITALS — SYSTOLIC BLOOD PRESSURE: 124 MMHG | DIASTOLIC BLOOD PRESSURE: 62 MMHG

## 2018-05-13 VITALS — DIASTOLIC BLOOD PRESSURE: 52 MMHG | SYSTOLIC BLOOD PRESSURE: 107 MMHG

## 2018-05-13 VITALS — DIASTOLIC BLOOD PRESSURE: 62 MMHG | SYSTOLIC BLOOD PRESSURE: 109 MMHG

## 2018-05-13 VITALS — DIASTOLIC BLOOD PRESSURE: 52 MMHG | SYSTOLIC BLOOD PRESSURE: 111 MMHG

## 2018-05-13 VITALS — DIASTOLIC BLOOD PRESSURE: 62 MMHG | SYSTOLIC BLOOD PRESSURE: 128 MMHG

## 2018-05-13 VITALS — SYSTOLIC BLOOD PRESSURE: 126 MMHG | DIASTOLIC BLOOD PRESSURE: 61 MMHG

## 2018-05-13 VITALS — SYSTOLIC BLOOD PRESSURE: 118 MMHG | DIASTOLIC BLOOD PRESSURE: 55 MMHG

## 2018-05-13 VITALS — SYSTOLIC BLOOD PRESSURE: 128 MMHG | DIASTOLIC BLOOD PRESSURE: 45 MMHG

## 2018-05-13 VITALS — DIASTOLIC BLOOD PRESSURE: 59 MMHG | SYSTOLIC BLOOD PRESSURE: 104 MMHG

## 2018-05-13 VITALS — SYSTOLIC BLOOD PRESSURE: 127 MMHG | DIASTOLIC BLOOD PRESSURE: 57 MMHG

## 2018-05-13 VITALS — DIASTOLIC BLOOD PRESSURE: 54 MMHG | SYSTOLIC BLOOD PRESSURE: 115 MMHG

## 2018-05-13 VITALS — DIASTOLIC BLOOD PRESSURE: 56 MMHG | SYSTOLIC BLOOD PRESSURE: 106 MMHG

## 2018-05-13 VITALS — SYSTOLIC BLOOD PRESSURE: 111 MMHG | DIASTOLIC BLOOD PRESSURE: 54 MMHG

## 2018-05-13 VITALS — SYSTOLIC BLOOD PRESSURE: 120 MMHG | DIASTOLIC BLOOD PRESSURE: 68 MMHG

## 2018-05-13 VITALS — SYSTOLIC BLOOD PRESSURE: 120 MMHG | DIASTOLIC BLOOD PRESSURE: 55 MMHG

## 2018-05-13 VITALS — SYSTOLIC BLOOD PRESSURE: 120 MMHG | DIASTOLIC BLOOD PRESSURE: 62 MMHG

## 2018-05-13 VITALS — SYSTOLIC BLOOD PRESSURE: 103 MMHG | DIASTOLIC BLOOD PRESSURE: 40 MMHG

## 2018-05-13 VITALS — SYSTOLIC BLOOD PRESSURE: 84 MMHG | DIASTOLIC BLOOD PRESSURE: 56 MMHG

## 2018-05-13 VITALS — SYSTOLIC BLOOD PRESSURE: 110 MMHG | DIASTOLIC BLOOD PRESSURE: 33 MMHG

## 2018-05-13 VITALS — SYSTOLIC BLOOD PRESSURE: 130 MMHG | DIASTOLIC BLOOD PRESSURE: 56 MMHG

## 2018-05-13 VITALS — SYSTOLIC BLOOD PRESSURE: 120 MMHG | DIASTOLIC BLOOD PRESSURE: 63 MMHG

## 2018-05-13 VITALS — SYSTOLIC BLOOD PRESSURE: 110 MMHG | DIASTOLIC BLOOD PRESSURE: 64 MMHG

## 2018-05-13 VITALS — DIASTOLIC BLOOD PRESSURE: 62 MMHG | SYSTOLIC BLOOD PRESSURE: 116 MMHG

## 2018-05-13 VITALS — DIASTOLIC BLOOD PRESSURE: 58 MMHG | SYSTOLIC BLOOD PRESSURE: 95 MMHG

## 2018-05-13 VITALS — SYSTOLIC BLOOD PRESSURE: 116 MMHG | DIASTOLIC BLOOD PRESSURE: 59 MMHG

## 2018-05-13 VITALS — SYSTOLIC BLOOD PRESSURE: 103 MMHG | DIASTOLIC BLOOD PRESSURE: 49 MMHG

## 2018-05-13 VITALS — SYSTOLIC BLOOD PRESSURE: 126 MMHG | DIASTOLIC BLOOD PRESSURE: 63 MMHG

## 2018-05-13 VITALS — SYSTOLIC BLOOD PRESSURE: 124 MMHG | DIASTOLIC BLOOD PRESSURE: 55 MMHG

## 2018-05-13 VITALS — SYSTOLIC BLOOD PRESSURE: 90 MMHG | DIASTOLIC BLOOD PRESSURE: 56 MMHG

## 2018-05-13 VITALS — SYSTOLIC BLOOD PRESSURE: 120 MMHG | DIASTOLIC BLOOD PRESSURE: 50 MMHG

## 2018-05-13 VITALS — DIASTOLIC BLOOD PRESSURE: 55 MMHG | SYSTOLIC BLOOD PRESSURE: 115 MMHG

## 2018-05-13 VITALS — SYSTOLIC BLOOD PRESSURE: 95 MMHG | DIASTOLIC BLOOD PRESSURE: 58 MMHG

## 2018-05-13 VITALS — DIASTOLIC BLOOD PRESSURE: 55 MMHG | SYSTOLIC BLOOD PRESSURE: 125 MMHG

## 2018-05-13 VITALS — SYSTOLIC BLOOD PRESSURE: 107 MMHG | DIASTOLIC BLOOD PRESSURE: 89 MMHG

## 2018-05-13 VITALS — DIASTOLIC BLOOD PRESSURE: 69 MMHG | SYSTOLIC BLOOD PRESSURE: 122 MMHG

## 2018-05-13 VITALS — SYSTOLIC BLOOD PRESSURE: 117 MMHG | DIASTOLIC BLOOD PRESSURE: 67 MMHG

## 2018-05-13 VITALS — SYSTOLIC BLOOD PRESSURE: 106 MMHG | DIASTOLIC BLOOD PRESSURE: 60 MMHG

## 2018-05-13 VITALS — DIASTOLIC BLOOD PRESSURE: 56 MMHG | SYSTOLIC BLOOD PRESSURE: 107 MMHG

## 2018-05-13 VITALS — SYSTOLIC BLOOD PRESSURE: 112 MMHG | DIASTOLIC BLOOD PRESSURE: 55 MMHG

## 2018-05-13 LAB
ANION GAP SERPL CALC-SCNC: 11.7 MMOL/L (ref 8–16)
BASOPHILS # BLD AUTO: 0 10*3/UL (ref 0–0.1)
BASOPHILS NFR BLD AUTO: 0.3 % (ref 0–1)
BUN SERPL-MCNC: 10 MG/DL (ref 7–26)
BUN/CREAT SERPL: 14 (ref 6–25)
CALCIUM SERPL-MCNC: 9 MG/DL (ref 8.4–10.2)
CHLORIDE SERPL-SCNC: 95 MMOL/L (ref 98–107)
CO2 SERPL-SCNC: 25 MMOL/L (ref 22–29)
DEPRECATED NEUTROPHILS # BLD AUTO: 10.9 10*3/UL (ref 2.1–6.9)
EGFRCR SERPLBLD CKD-EPI 2021: > 60 ML/MIN (ref 60–?)
EOSINOPHIL # BLD AUTO: 0.1 10*3/UL (ref 0–0.4)
EOSINOPHIL NFR BLD AUTO: 0.8 % (ref 0–6)
ERYTHROCYTE [DISTWIDTH] IN CORD BLOOD: 13.9 % (ref 11.7–14.4)
GLUCOSE SERPLBLD-MCNC: 118 MG/DL (ref 74–118)
HCT VFR BLD AUTO: 40.8 % (ref 38.2–49.6)
HGB BLD-MCNC: 13.9 G/DL (ref 14–18)
LYMPHOCYTES # BLD: 1.5 10*3/UL (ref 1–3.2)
LYMPHOCYTES NFR BLD AUTO: 10.4 % (ref 18–39.1)
LYMPHOCYTES NFR BLD MANUAL: 7 % (ref 19–48)
MCH RBC QN AUTO: 30.2 PG (ref 28–32)
MCHC RBC AUTO-ENTMCNC: 34.1 G/DL (ref 31–35)
MCV RBC AUTO: 88.5 FL (ref 81–99)
MONOCYTES # BLD AUTO: 1.8 10*3/UL (ref 0.2–0.8)
MONOCYTES NFR BLD AUTO: 12.5 % (ref 4.4–11.3)
MONOCYTES NFR BLD MANUAL: 12 % (ref 3.4–9)
NEUTS SEG NFR BLD AUTO: 75.2 % (ref 38.7–80)
NEUTS SEG NFR BLD MANUAL: 81 % (ref 40–74)
PLAT MORPH BLD: NORMAL
PLATELET # BLD AUTO: 194 X10E3/UL (ref 140–360)
PLATELET # BLD EST: ADEQUATE 10*3/UL
POTASSIUM SERPL-SCNC: 3.7 MMOL/L (ref 3.5–5.1)
RBC # BLD AUTO: 4.61 X10E6/UL (ref 4.3–5.7)
RBC MORPH BLD: NORMAL
SODIUM SERPL-SCNC: 128 MMOL/L (ref 136–145)

## 2018-05-13 RX ADMIN — ENOXAPARIN SODIUM SCH MG: 100 INJECTION SUBCUTANEOUS at 08:49

## 2018-05-13 RX ADMIN — METOPROLOL TARTRATE SCH MG: 25 TABLET, FILM COATED ORAL at 06:00

## 2018-05-13 RX ADMIN — DIGOXIN SCH MG: 0.25 TABLET ORAL at 08:49

## 2018-05-13 RX ADMIN — ASPIRIN 81 MG CHEWABLE TABLET SCH MG: 81 TABLET CHEWABLE at 08:48

## 2018-05-13 RX ADMIN — METOPROLOL TARTRATE SCH MG: 25 TABLET, FILM COATED ORAL at 00:02

## 2018-05-13 RX ADMIN — METOPROLOL TARTRATE SCH MG: 25 TABLET, FILM COATED ORAL at 23:58

## 2018-05-13 RX ADMIN — SODIUM CHLORIDE TAB 1 GM SCH GM: 1 TAB at 20:58

## 2018-05-13 RX ADMIN — SODIUM CHLORIDE SCH GM: 9 INJECTION, SOLUTION INTRAVENOUS at 06:30

## 2018-05-13 RX ADMIN — ENOXAPARIN SODIUM SCH MG: 100 INJECTION SUBCUTANEOUS at 20:58

## 2018-05-13 RX ADMIN — NICOTINE SCH MG: 21 PATCH, EXTENDED RELEASE TRANSDERMAL at 07:44

## 2018-05-13 RX ADMIN — METOPROLOL TARTRATE SCH MG: 25 TABLET, FILM COATED ORAL at 11:51

## 2018-05-13 RX ADMIN — LEVOFLOXACIN SCH MLS/HR: 5 INJECTION, SOLUTION INTRAVENOUS at 11:50

## 2018-05-13 RX ADMIN — METOPROLOL TARTRATE SCH MG: 25 TABLET, FILM COATED ORAL at 18:09

## 2018-05-13 NOTE — PROGRESS NOTE
DATE:  May 13, 2018 



PULMONARY MEDICINE PROGRESS NOTE



This is coverage for Dr. Mychal French.



SUBJECTIVE:  Mr. Marques was seen and examined at bedside. His arrhythmia 

broke, and he has had no problems since 4 p.m. yesterday. Heart rate is now 

in control at about 82 beats per minute. No hypotension. No worsened 

shortness of breath. He is tolerating the blood thinners for now. Echo was 

done with 50% to 55% LVEF with trial pericardial effusion, difficulty 

estimating right ventricular systolic pressure. Ultrasound mentioned no DVT 

present.



REVIEW OF SYSTEMS:  No headaches, no bleeding.



OBJECTIVE

VITAL SIGNS:  Afebrile. Vital signs noted per electronic record.

GENERALLY:  No acute distress, alert and calm. 

HEENT:  Normocephalic, atraumatic.

NECK:  Supple. Throat midline.

LUNGS:  Bilateral air entry, a few rhonchi.

CARDIOVASCULAR:  S1 and S2. No murmurs, rubs or gallops.

ABDOMINAL:  Soft, nontender.

EXTREMITIES:  No clubbing, no cyanosis. There is the nonspecific lower 

extremity pitting edema.

INTEGUMENT:  No rash. No purpura.



LABS:  BUN 10, creatinine 0.7. Potassium 3.7. White count 15, hematocrit 

41, platelets 194.



IMPRESSION AND PLAN

1. Bilateral pulmonary emboli.

2. Atrial fibrillation with rapid ventricular rate, now controlled.

3. Hyponatremia at baseline.

4. Bilateral lung infiltrates, right much greater than left. Possible 

pulmonary infarct versus other infection.

5. Febrile syndrome, not otherwise specified.



Continue Lovenox for DVT. Will change out to other anticoagulant at 

discharge. Continue nicotine patch for now. Have notified him this could 

lead to some clots and higher chance of having an emboli. Patient will be 

continued on empirical antibiotics in the meanwhile. Will follow along 

closely. Oxygen empirically to be given for possible pulmonary 

hypertension. Try a dose of Lasix and potassium and follow up this so as to 

ensure it does not drop. Will follow along closely.











DD:  05/13/2018 11:46

DT:  05/13/2018 12:27

Job#:  [id number]

## 2018-05-13 NOTE — PROGRESS NOTE
DATE:  May 13, 2018 



CARDIOLOGY PROGRESS NOTE



SUBJECTIVE:  No new complaints.  Frustrated about staying in the hospital.  

Wants to ambulate more.  



OBJECTIVE  

VITALS:  Temperature 98.7, heart rate 84, respiratory rate 12, blood 

pressure 116/59, O2 sat 95% on room air.  In sinus rhythm on telemetry.  

GENERAL:  No acute distress. 

CHEST:  Decreased breath sounds in bilateral bases. 

CARDIOVASCULAR:  Regular rate and rhythm.  Normal S1 and S2.  No S3.  No 

S4.  No murmurs or rubs. 

ABDOMEN:  Soft. 

EXTREMITIES:  Edema 1+ bilateral lower extremities with hyperpigmented 

changes to lower extremities. 



CARDIOVASCULAR MEDICATIONS

1.  Metoprolol tartrate 50 mg q.6 h.  

2.  Digoxin 0.25 mg daily.  

3.  Lovenox 100 mg subcutaneously q.12 h.  

4.  Aspirin 81 mg daily.



STUDIES:  White blood cells 14.5, hemoglobin 13.9 and platelets 194,000.  

Sodium 128, potassium 3.7, chloride 95, bicarbonate 25, BUN 10, creatinine 

0.72, glucose 118.  Calcium 9.



ASSESSMENT

1.  Hyponatremia.

2.  Bilateral pulmonary embolism.

3.  Atrial fibrillation with episodes of ventricular response, now well 

controlled.

4.  Lung infiltrates, possibly pulmonary infarct versus infection.

5.  Fever.

6.  Morbid obesity.

7.  Preserved left ventricle systolic function with mild concentric 

hypertrophy on echocardiogram.

8.  Pleuritic type chest pain.

9.  Bilateral lower extremity edema.



RECOMMENDATIONS:  Ultrasound of bilateral lower extremity reviewed.  No 

evidence of deep venous thrombosis.  Continue current cardiovascular 

medications.  Okay to ambulate with assistance.  Transition upon discharge 

to Eliquis 10 mg q.12 h. for 1 week followed by 5 mg every 12 hours 

thereafter.  Undergoing antibiotic therapy.  











DD:  05/13/2018 13:40

DT:  05/13/2018 14:20

Job#:  [id number]

## 2018-05-13 NOTE — PROGRESS NOTE
DATE:  May 13, 2018 



TIME:  7:09 p.m.



OVERNIGHT:  No events.  Feeling better.



REVIEW OF SYSTEMS:  Denies any dizziness.



PHYSICAL EXAMINATION:

VITAL SIGNS:  Reviewed.

GENERAL APPEARANCE:  Tired-appearing man resting in bed.

HEENT:  Anicteric.

CARDIOVASCULAR:  Normal S1 and S2.

LUNGS:  Good chest wall expansion.

ABDOMEN:  Soft, nontender.

EXTREMITIES:  Trace edema.

SKIN:  Dry.

PSYCHIATRIC:  Normal affect.



LABS:  Reviewed.



MEDICATIONS:  Reviewed.



ASSESSMENT:  A 57-year-old man.

1. Atrial fibrillation with rapid ventricular response.

2. Pulmonary embolism in bilateral lungs.

3. Cigarette abuse.

4. Ground-glass opacity, right lung.

5. Leukocytosis.

6. Obesity.  Body mass index 37.5.

7. Hyponatremia.



PLAN:

1. Continue anticoagulants.

2. Plan for factor X inhibitor on discharge.

3. Continue rate control.

4. Leukocytosis has been improving.

5. Hyponatremia.  Will give salt tablets and reassess.

6. Continue digoxin and beta blocker.

7. Continue nicotine patch.

8. Discharge planning.



Critical care time more than 35 minutes.  Will transfer to the medical 

floor.







DD:  05/13/2018 19:11

DT:  05/13/2018 20:45

Job#:  [id number]

## 2018-05-14 VITALS — SYSTOLIC BLOOD PRESSURE: 108 MMHG | DIASTOLIC BLOOD PRESSURE: 56 MMHG

## 2018-05-14 VITALS — DIASTOLIC BLOOD PRESSURE: 74 MMHG | SYSTOLIC BLOOD PRESSURE: 106 MMHG

## 2018-05-14 VITALS — SYSTOLIC BLOOD PRESSURE: 105 MMHG | DIASTOLIC BLOOD PRESSURE: 53 MMHG

## 2018-05-14 VITALS — SYSTOLIC BLOOD PRESSURE: 126 MMHG | DIASTOLIC BLOOD PRESSURE: 68 MMHG

## 2018-05-14 VITALS — DIASTOLIC BLOOD PRESSURE: 73 MMHG | SYSTOLIC BLOOD PRESSURE: 90 MMHG

## 2018-05-14 VITALS — DIASTOLIC BLOOD PRESSURE: 60 MMHG | SYSTOLIC BLOOD PRESSURE: 120 MMHG

## 2018-05-14 VITALS — DIASTOLIC BLOOD PRESSURE: 84 MMHG | SYSTOLIC BLOOD PRESSURE: 107 MMHG

## 2018-05-14 VITALS — DIASTOLIC BLOOD PRESSURE: 63 MMHG | SYSTOLIC BLOOD PRESSURE: 111 MMHG

## 2018-05-14 VITALS — SYSTOLIC BLOOD PRESSURE: 80 MMHG | DIASTOLIC BLOOD PRESSURE: 50 MMHG

## 2018-05-14 VITALS — SYSTOLIC BLOOD PRESSURE: 79 MMHG | DIASTOLIC BLOOD PRESSURE: 68 MMHG

## 2018-05-14 VITALS — SYSTOLIC BLOOD PRESSURE: 117 MMHG | DIASTOLIC BLOOD PRESSURE: 54 MMHG

## 2018-05-14 VITALS — SYSTOLIC BLOOD PRESSURE: 95 MMHG | DIASTOLIC BLOOD PRESSURE: 73 MMHG

## 2018-05-14 VITALS — SYSTOLIC BLOOD PRESSURE: 122 MMHG | DIASTOLIC BLOOD PRESSURE: 57 MMHG

## 2018-05-14 VITALS — SYSTOLIC BLOOD PRESSURE: 120 MMHG | DIASTOLIC BLOOD PRESSURE: 59 MMHG

## 2018-05-14 VITALS — SYSTOLIC BLOOD PRESSURE: 123 MMHG | DIASTOLIC BLOOD PRESSURE: 54 MMHG

## 2018-05-14 VITALS — SYSTOLIC BLOOD PRESSURE: 77 MMHG | DIASTOLIC BLOOD PRESSURE: 34 MMHG

## 2018-05-14 VITALS — DIASTOLIC BLOOD PRESSURE: 57 MMHG | SYSTOLIC BLOOD PRESSURE: 94 MMHG

## 2018-05-14 VITALS — DIASTOLIC BLOOD PRESSURE: 124 MMHG | SYSTOLIC BLOOD PRESSURE: 138 MMHG

## 2018-05-14 VITALS — SYSTOLIC BLOOD PRESSURE: 91 MMHG | DIASTOLIC BLOOD PRESSURE: 58 MMHG

## 2018-05-14 VITALS — SYSTOLIC BLOOD PRESSURE: 88 MMHG | DIASTOLIC BLOOD PRESSURE: 39 MMHG

## 2018-05-14 VITALS — SYSTOLIC BLOOD PRESSURE: 84 MMHG | DIASTOLIC BLOOD PRESSURE: 73 MMHG

## 2018-05-14 VITALS — DIASTOLIC BLOOD PRESSURE: 76 MMHG | SYSTOLIC BLOOD PRESSURE: 90 MMHG

## 2018-05-14 VITALS — SYSTOLIC BLOOD PRESSURE: 123 MMHG | DIASTOLIC BLOOD PRESSURE: 67 MMHG

## 2018-05-14 VITALS — DIASTOLIC BLOOD PRESSURE: 68 MMHG | SYSTOLIC BLOOD PRESSURE: 120 MMHG

## 2018-05-14 VITALS — DIASTOLIC BLOOD PRESSURE: 66 MMHG | SYSTOLIC BLOOD PRESSURE: 90 MMHG

## 2018-05-14 VITALS — DIASTOLIC BLOOD PRESSURE: 80 MMHG | SYSTOLIC BLOOD PRESSURE: 101 MMHG

## 2018-05-14 VITALS — SYSTOLIC BLOOD PRESSURE: 109 MMHG | DIASTOLIC BLOOD PRESSURE: 41 MMHG

## 2018-05-14 VITALS — SYSTOLIC BLOOD PRESSURE: 107 MMHG | DIASTOLIC BLOOD PRESSURE: 56 MMHG

## 2018-05-14 VITALS — DIASTOLIC BLOOD PRESSURE: 51 MMHG | SYSTOLIC BLOOD PRESSURE: 120 MMHG

## 2018-05-14 LAB
ALBUMIN SERPL-MCNC: 2.3 G/DL (ref 3.5–5)
ALBUMIN/GLOB SERPL: 0.5 {RATIO} (ref 0.8–2)
ALP SERPL-CCNC: 72 IU/L (ref 40–150)
ALT SERPL-CCNC: 32 IU/L (ref 0–55)
ANION GAP SERPL CALC-SCNC: 13.7 MMOL/L (ref 8–16)
BASOPHILS # BLD AUTO: 0.1 10*3/UL (ref 0–0.1)
BASOPHILS NFR BLD AUTO: 0.4 % (ref 0–1)
BUN SERPL-MCNC: 12 MG/DL (ref 7–26)
BUN/CREAT SERPL: 16 (ref 6–25)
CALCIUM SERPL-MCNC: 9.4 MG/DL (ref 8.4–10.2)
CHLORIDE SERPL-SCNC: 92 MMOL/L (ref 98–107)
CO2 SERPL-SCNC: 27 MMOL/L (ref 22–29)
DEPRECATED NEUTROPHILS # BLD AUTO: 10.4 10*3/UL (ref 2.1–6.9)
EGFRCR SERPLBLD CKD-EPI 2021: > 60 ML/MIN (ref 60–?)
EOSINOPHIL # BLD AUTO: 0.3 10*3/UL (ref 0–0.4)
EOSINOPHIL NFR BLD AUTO: 1.7 % (ref 0–6)
EOSINOPHIL NFR BLD MANUAL: 1 % (ref 0–7)
ERYTHROCYTE [DISTWIDTH] IN CORD BLOOD: 13.9 % (ref 11.7–14.4)
GLOBULIN PLAS-MCNC: 4.8 G/DL (ref 2.3–3.5)
GLUCOSE SERPLBLD-MCNC: 106 MG/DL (ref 74–118)
HCT VFR BLD AUTO: 42.1 % (ref 38.2–49.6)
HGB BLD-MCNC: 14.1 G/DL (ref 14–18)
LYMPHOCYTES # BLD: 1.7 10*3/UL (ref 1–3.2)
LYMPHOCYTES NFR BLD AUTO: 11.8 % (ref 18–39.1)
LYMPHOCYTES NFR BLD MANUAL: 10 % (ref 19–48)
MAGNESIUM SERPL-MCNC: 1.6 MG/DL (ref 1.3–2.1)
MCH RBC QN AUTO: 29.7 PG (ref 28–32)
MCHC RBC AUTO-ENTMCNC: 33.5 G/DL (ref 31–35)
MCV RBC AUTO: 88.8 FL (ref 81–99)
MONOCYTES # BLD AUTO: 1.9 10*3/UL (ref 0.2–0.8)
MONOCYTES NFR BLD AUTO: 13.2 % (ref 4.4–11.3)
MONOCYTES NFR BLD MANUAL: 11 % (ref 3.4–9)
NEUTS SEG NFR BLD AUTO: 72 % (ref 38.7–80)
NEUTS SEG NFR BLD MANUAL: 78 % (ref 40–74)
PLAT MORPH BLD: NORMAL
PLATELET # BLD AUTO: 221 X10E3/UL (ref 140–360)
PLATELET # BLD EST: ADEQUATE 10*3/UL
POTASSIUM SERPL-SCNC: 3.7 MMOL/L (ref 3.5–5.1)
RBC # BLD AUTO: 4.74 X10E6/UL (ref 4.3–5.7)
RBC MORPH BLD: NORMAL
SODIUM SERPL-SCNC: 129 MMOL/L (ref 136–145)

## 2018-05-14 RX ADMIN — SODIUM CHLORIDE TAB 1 GM SCH GM: 1 TAB at 08:54

## 2018-05-14 RX ADMIN — ENOXAPARIN SODIUM SCH MG: 100 INJECTION SUBCUTANEOUS at 08:54

## 2018-05-14 RX ADMIN — METOPROLOL TARTRATE SCH MG: 25 TABLET, FILM COATED ORAL at 06:00

## 2018-05-14 RX ADMIN — METOPROLOL TARTRATE SCH MG: 25 TABLET, FILM COATED ORAL at 10:50

## 2018-05-14 RX ADMIN — ASPIRIN 81 MG CHEWABLE TABLET SCH MG: 81 TABLET CHEWABLE at 08:53

## 2018-05-14 RX ADMIN — SODIUM CHLORIDE SCH GM: 9 INJECTION, SOLUTION INTRAVENOUS at 05:28

## 2018-05-14 RX ADMIN — SODIUM CHLORIDE TAB 1 GM SCH GM: 1 TAB at 14:37

## 2018-05-14 RX ADMIN — METOPROLOL TARTRATE SCH MG: 25 TABLET, FILM COATED ORAL at 05:28

## 2018-05-14 RX ADMIN — LEVOFLOXACIN SCH MLS/HR: 5 INJECTION, SOLUTION INTRAVENOUS at 08:54

## 2018-05-14 RX ADMIN — NICOTINE SCH MG: 21 PATCH, EXTENDED RELEASE TRANSDERMAL at 08:54

## 2018-05-14 RX ADMIN — DIGOXIN SCH MG: 0.25 TABLET ORAL at 08:54

## 2018-05-14 RX ADMIN — METOPROLOL TARTRATE SCH MG: 25 TABLET, FILM COATED ORAL at 14:39

## 2018-05-14 NOTE — PROGRESS NOTE
DATE:  May 14, 2018 



CARDIOLOGY PROGRESS NOTE



SUBJECTIVE:  The patient denies chest pain or shortness of breath.  His 

pleuritic chest pain has resolved.



OBJECTIVE

VITAL SIGNS:  Temperature 98.8 degrees.  Pulse 116.  Respiratory rate 16.  

Blood pressure 111/60.  Oxygen saturation 92% on room air.

GENERAL:  Awake, alert, no acute distress.  Obese.

LUNGS:  Clear to auscultation bilaterally.  No wheezes, rales, or crackles.

CARDIOVASCULAR:  Tachycardic.  Irregularly irregular.  Normal S1, S2.  No 

murmur.

ABDOMEN:  Soft.

EXTREMITIES:  1+ edema bilateral.  Hyperpigmented changes to lower 

extremities and lymphedema.



CARDIAC MEDICATIONS

1. Metoprolol tartrate 50 mg p.o. q.6 hours.

2. Digoxin 0.25 mg p.o. daily.

3. Enoxaparin 100 mg subcu q.12 hours.

4. Aspirin 81 mg p.o. daily.



LABS:  WBC 14.44, hemoglobin 14.1, hematocrit 42.1, platelets 221, sodium 

129, potassium 3.7, chloride 92, CO2 27, BUN 12, creatinine 0.77.



Telemetry:  AFib with RVR. 



IMPRESSION

1. Hyponatremia.

2. Bilateral pulmonary embolism with large burden in the right lower 

lobe artery.

3. Atrial fibrillation with rapid ventricular response.

4. Acute hypoxic respiratory failure due to pulmonary emboli.

5. Pleuritic chest pain, resolved.

6. Obesity.

7. Lung infiltrate, possibly pulmonary infarct versus infection.  

Preserved left ventricular systolic function with mild left ventricular 

hypertrophy on echocardiogram.

8. Continue current cardiac medication.  Patient's rate is not well 

controlled today.  We will request the patient ambulate.  If his heart 

rate is not well controlled with ambulation, he will need addition of 

calcium channel blockers for further rate control.  If this is not 

successful, may need TE cardioversion.  Continue anticoagulation.  Plan 

to transition to Eliquis 10 mg p.o. q.12 hours _______ followed by 5 mg 

p.o. q.12 hours thereafter.  Further evaluation of hyponatremia per 

primary service.

9. Thank you for this consult.  We will continue to follow.









DD:  05/14/2018 11:08

DT:  05/14/2018 12:08

Job#:  [id number]

## 2018-05-14 NOTE — PROGRESS NOTE
DATE:    



PULMONARY PROGRESS NOTE 



SUBJECTIVE:  The patient is a 57-year-old man who was admitted due to 

pulmonary emboli.  Today, no new complaints.  He has been in atrial 

fibrillation, currently in sinus rhythm.  He has been in hypotension.  

Currently, the levels are more normal.  The blood pressure is 111/63.  

Pulse is 93.  Oxygen saturation 95%.  Respiratory rate is 18.  As reported, 

he is feeling much better.



OBJECTIVE

HEENT:  Atraumatic.

NECK:  No tenderness.

LUNGS:  Pretty clean. 

ABDOMEN:  Soft.  No organomegaly.

EXTREMITIES:  Pedal edema.



LABS:  WBC 14.44, sodium 129, albumin 2.3.  The rest of the labs are 

unremarkable. 



IMPRESSION

1. Pulmonary emboli.

2. Off and on atrial fibrillation. 

3. History of smoking.

4. Obesity.

5. Pedal edema.





RECOMMENDATION:  If this patient is stable, he can be released.  He can 

continue with Eliquis 10 mg q.12 h. up to 2 weeks and then 5 mg q.12 h.  







DD:  05/14/2018 12:22

DT:  05/14/2018 12:38

Job#:  [id number]

## 2018-05-14 NOTE — PROGRESS NOTE
DATE:  May 12, 2018 



TIME:  7:00 a.m.



OVERNIGHT:  No events.



REVIEW OF SYSTEMS:  Denies any dizziness.



PHYSICAL EXAMINATION:

VITAL SIGNS:  Reviewed.

GENERAL APPEARANCE:  Tired-appearing man resting in bed.

HEENT:  Anicteric.

CARDIOVASCULAR:  Normal S1 and S2.

LUNGS:  Moderate breath sounds.

ABDOMEN:  Soft, nontender, nondistended.

EXTREMITIES:  Trace edema.

SKIN:  Dry.

PSYCHIATRIC:  Normal affect.



LABS:  Reviewed.



MEDICATIONS:  Reviewed.



ASSESSMENT:  A 57-year-old man.

1. Atrial fibrillation with rapid ventricular response.

2. Pulmonary embolism in bilateral lungs.

3. Cigarette abuse.

4. Ground-glass opacity, right lung.

5. Leukocytosis.

6. Obesity.

7. Hyponatremia.



PLAN:

1. Continue anticoagulant.

2. Plan for factor X inhibitor on discharge.

3. Follow up 2D echo.

4. Continue nicotine patch.

5. Continue oxygen support.



Critical care time more than 35 minutes.







DD:  05/13/2018 19:09

DT:  05/13/2018 20:38

Job#:  [id number]

## 2018-05-14 NOTE — DISCHARGE SUMMARY
PRINCIPAL DIAGNOSES 

1.  Atrial fibrillation with rapid ventricular response.

2.  Pulmonary embolism in bilateral lungs.

3.  Cigarette abuse.

4.  Obesity.  Body  mass index 37.5.

5.  Hyponatremia.



SECONDARY DIAGNOSIS:  Cigarette abuse.



CHIEF COMPLAINT:  Shortness of breath.



HISTORY:  A 57-year-old man with shortness of breath.  Refer to the [facility] 

for further details.



HOSPITAL COURSE:  The patient was found to have atrial fibrillation with 

rapid ventricular response.  Received beta blocker and digoxin, and other 

medications.  He had pulmonary embolism in bilateral lungs.  He received 

Lovenox, and was being transitioned to Eliquis upon discharge.  Cigarette 

abuse and counseled on cessation.  Started on nicotine patch.  He had 

obesity with BMI 37.5.  Screening was negative for diabetes.  Hyponatremia 

and received salt tablets.  Will be discharged on salt tablets. 



DISCHARGE MEDICATIONS:  Per electronic medical record and include Eliquis 

10 mg q.12 h. for 7 days, and then transition to 5 mg q.12 h. 



FOLLOWUP 

1.  Primary care doctor in 1 week.

2.  His cardiologist.



CONDITION ON DISCHARGE:  Stable and improving.





DISCHARGE LOCATION:  Home.  The patient is fit for duty after 7 days of 

rest.  



 



 _________________________________

MALCOLM POLANCO MD



DD:  05/14/2018 07:15

DT:  05/14/2018 07:28

Job#:  [id number]